# Patient Record
Sex: MALE | Employment: OTHER
[De-identification: names, ages, dates, MRNs, and addresses within clinical notes are randomized per-mention and may not be internally consistent; named-entity substitution may affect disease eponyms.]

---

## 2021-09-07 ENCOUNTER — NEW PATIENT (OUTPATIENT)
Age: 81
End: 2021-09-07

## 2021-09-07 DIAGNOSIS — H50.10: ICD-10-CM

## 2021-09-07 DIAGNOSIS — H02.88B: ICD-10-CM

## 2021-09-07 DIAGNOSIS — H18.593: ICD-10-CM

## 2021-09-07 DIAGNOSIS — H02.88A: ICD-10-CM

## 2021-09-07 DIAGNOSIS — Z98.890: ICD-10-CM

## 2021-09-07 DIAGNOSIS — H35.3132: ICD-10-CM

## 2021-09-07 DIAGNOSIS — H40.013: ICD-10-CM

## 2021-09-07 DIAGNOSIS — H25.13: ICD-10-CM

## 2021-09-07 PROCEDURE — 92025 CPTRIZED CORNEAL TOPOGRAPHY: CPT

## 2021-09-07 PROCEDURE — 76514 ECHO EXAM OF EYE THICKNESS: CPT

## 2021-09-07 PROCEDURE — 92134 CPTRZ OPH DX IMG PST SGM RTA: CPT

## 2021-09-07 PROCEDURE — 92004 COMPRE OPH EXAM NEW PT 1/>: CPT

## 2021-09-07 ASSESSMENT — KERATOMETRY
OD_AXISANGLE2_DEGREES: 177
OS_K1POWER_DIOPTERS: 42.75
OD_K1POWER_DIOPTERS: 41.00
OD_K2POWER_DIOPTERS: 43.00
OS_AXISANGLE2_DEGREES: 180
OS_AXISANGLE_DEGREES: 90
OD_AXISANGLE_DEGREES: 87
OS_K2POWER_DIOPTERS: 45.25

## 2021-09-07 ASSESSMENT — PACHYMETRY
OS_CT_UM: 579
OD_CT_UM: 585

## 2021-09-07 ASSESSMENT — VISUAL ACUITY
OD_CC: 20/40-2
OS_CC: 20/50+1
OS_PH: 20/30-2

## 2021-09-07 ASSESSMENT — TONOMETRY
OD_IOP_MMHG: 16
OS_IOP_MMHG: 15

## 2021-09-15 ENCOUNTER — DIAGNOSTICS ONLY (OUTPATIENT)
Age: 81
End: 2021-09-15

## 2021-09-15 DIAGNOSIS — H47.012: ICD-10-CM

## 2021-09-15 DIAGNOSIS — H40.013: ICD-10-CM

## 2021-09-15 PROCEDURE — 92133 CPTRZD OPH DX IMG PST SGM ON: CPT

## 2021-09-15 PROCEDURE — 92083 EXTENDED VISUAL FIELD XM: CPT

## 2021-09-15 ASSESSMENT — KERATOMETRY
OS_AXISANGLE2_DEGREES: 180
OD_AXISANGLE2_DEGREES: 177
OS_K2POWER_DIOPTERS: 45.25
OD_K2POWER_DIOPTERS: 43.00
OD_AXISANGLE_DEGREES: 87
OS_K1POWER_DIOPTERS: 42.75
OS_AXISANGLE_DEGREES: 90
OD_K1POWER_DIOPTERS: 41.00

## 2022-03-17 ENCOUNTER — IOP CHECK (OUTPATIENT)
Age: 82
End: 2022-03-17

## 2022-03-17 DIAGNOSIS — H47.012: ICD-10-CM

## 2022-03-17 DIAGNOSIS — H02.88A: ICD-10-CM

## 2022-03-17 DIAGNOSIS — H02.88B: ICD-10-CM

## 2022-03-17 DIAGNOSIS — H35.3132: ICD-10-CM

## 2022-03-17 DIAGNOSIS — H18.593: ICD-10-CM

## 2022-03-17 DIAGNOSIS — H40.013: ICD-10-CM

## 2022-03-17 PROCEDURE — 92012 INTRM OPH EXAM EST PATIENT: CPT

## 2022-03-17 PROCEDURE — 92134 CPTRZ OPH DX IMG PST SGM RTA: CPT

## 2022-03-17 ASSESSMENT — TONOMETRY
OS_IOP_MMHG: 16
OD_IOP_MMHG: 16

## 2022-03-17 ASSESSMENT — VISUAL ACUITY
OS_CC: 20/40-2
OD_CC: 20/40-2

## 2022-09-20 ENCOUNTER — COMPLETE EYE EXAM (OUTPATIENT)
Dept: URBAN - METROPOLITAN AREA CLINIC 71 | Facility: CLINIC | Age: 82
End: 2022-09-20

## 2022-09-20 DIAGNOSIS — H35.3132: ICD-10-CM

## 2022-09-20 DIAGNOSIS — H50.10: ICD-10-CM

## 2022-09-20 DIAGNOSIS — H18.593: ICD-10-CM

## 2022-09-20 DIAGNOSIS — H40.013: ICD-10-CM

## 2022-09-20 DIAGNOSIS — Z98.890: ICD-10-CM

## 2022-09-20 DIAGNOSIS — H02.88B: ICD-10-CM

## 2022-09-20 DIAGNOSIS — H25.13: ICD-10-CM

## 2022-09-20 DIAGNOSIS — H02.88A: ICD-10-CM

## 2022-09-20 DIAGNOSIS — H47.012: ICD-10-CM

## 2022-09-20 PROCEDURE — 92025 CPTRIZED CORNEAL TOPOGRAPHY: CPT

## 2022-09-20 PROCEDURE — 92083 EXTENDED VISUAL FIELD XM: CPT

## 2022-09-20 PROCEDURE — 76514 ECHO EXAM OF EYE THICKNESS: CPT | Mod: NC

## 2022-09-20 PROCEDURE — 92133 CPTRZD OPH DX IMG PST SGM ON: CPT

## 2022-09-20 PROCEDURE — 92014 COMPRE OPH EXAM EST PT 1/>: CPT

## 2022-09-20 ASSESSMENT — VISUAL ACUITY
OS_CC: 20/40
OD_CC: 20/50
OD_PH: 20/40

## 2022-09-20 ASSESSMENT — KERATOMETRY
OS_AXISANGLE_DEGREES: 96
OS_AXISANGLE2_DEGREES: 6
OD_K2POWER_DIOPTERS: 43.25
OS_K1POWER_DIOPTERS: 41.25
OD_K1POWER_DIOPTERS: 41.00
OD_AXISANGLE_DEGREES: 78
OD_AXISANGLE2_DEGREES: 168
OS_K2POWER_DIOPTERS: 43.50

## 2022-09-20 ASSESSMENT — TONOMETRY
OS_IOP_MMHG: 15
OD_IOP_MMHG: 17

## 2022-09-20 ASSESSMENT — PACHYMETRY
OD_CT_UM: 585
OS_CT_UM: 579

## 2023-02-23 ENCOUNTER — PROBLEM (OUTPATIENT)
Dept: URBAN - METROPOLITAN AREA CLINIC 71 | Facility: CLINIC | Age: 83
End: 2023-02-23

## 2023-02-23 DIAGNOSIS — H50.10: ICD-10-CM

## 2023-02-23 DIAGNOSIS — H40.013: ICD-10-CM

## 2023-02-23 DIAGNOSIS — H18.593: ICD-10-CM

## 2023-02-23 DIAGNOSIS — Z98.890: ICD-10-CM

## 2023-02-23 DIAGNOSIS — H02.88B: ICD-10-CM

## 2023-02-23 DIAGNOSIS — H35.3221: ICD-10-CM

## 2023-02-23 DIAGNOSIS — H02.88A: ICD-10-CM

## 2023-02-23 DIAGNOSIS — H25.13: ICD-10-CM

## 2023-02-23 DIAGNOSIS — H47.012: ICD-10-CM

## 2023-02-23 DIAGNOSIS — H35.3112: ICD-10-CM

## 2023-02-23 PROCEDURE — 92134 CPTRZ OPH DX IMG PST SGM RTA: CPT

## 2023-02-23 PROCEDURE — 99215 OFFICE O/P EST HI 40 MIN: CPT

## 2023-02-23 ASSESSMENT — TONOMETRY
OS_IOP_MMHG: 15
OD_IOP_MMHG: 15

## 2023-02-23 ASSESSMENT — VISUAL ACUITY
OS_CC: 20/50-2
OD_CC: 20/50

## 2023-08-09 ENCOUNTER — APPOINTMENT (RX ONLY)
Dept: URBAN - METROPOLITAN AREA CLINIC 184 | Facility: CLINIC | Age: 83
Setting detail: DERMATOLOGY
End: 2023-08-09

## 2023-08-09 DIAGNOSIS — L57.8 OTHER SKIN CHANGES DUE TO CHRONIC EXPOSURE TO NONIONIZING RADIATION: ICD-10-CM

## 2023-08-09 DIAGNOSIS — L81.4 OTHER MELANIN HYPERPIGMENTATION: ICD-10-CM

## 2023-08-09 DIAGNOSIS — D18.0 HEMANGIOMA: ICD-10-CM

## 2023-08-09 DIAGNOSIS — L71.1 RHINOPHYMA: ICD-10-CM

## 2023-08-09 DIAGNOSIS — L82.1 OTHER SEBORRHEIC KERATOSIS: ICD-10-CM

## 2023-08-09 DIAGNOSIS — L71.8 OTHER ROSACEA: ICD-10-CM

## 2023-08-09 PROBLEM — D18.01 HEMANGIOMA OF SKIN AND SUBCUTANEOUS TISSUE: Status: ACTIVE | Noted: 2023-08-09

## 2023-08-09 PROCEDURE — ? PRESCRIPTION

## 2023-08-09 PROCEDURE — ? COUNSELING

## 2023-08-09 PROCEDURE — ? PRESCRIPTION MEDICATION MANAGEMENT

## 2023-08-09 PROCEDURE — 99204 OFFICE O/P NEW MOD 45 MIN: CPT

## 2023-08-09 RX ORDER — METRONIDAZOLE 7.5 MG/G
0.75% GEL TOPICAL
Qty: 45 | Refills: 6 | Status: CANCELLED | COMMUNITY
Start: 2023-08-09

## 2023-08-09 RX ADMIN — METRONIDAZOLE 0.75%: 7.5 GEL TOPICAL at 00:00

## 2023-08-09 ASSESSMENT — LOCATION ZONE DERM
LOCATION ZONE: ARM
LOCATION ZONE: NECK
LOCATION ZONE: TRUNK
LOCATION ZONE: ARM
LOCATION ZONE: NOSE
LOCATION ZONE: NOSE
LOCATION ZONE: NECK
LOCATION ZONE: TRUNK
LOCATION ZONE: FACE
LOCATION ZONE: FACE

## 2023-08-09 ASSESSMENT — LOCATION SIMPLE DESCRIPTION DERM
LOCATION SIMPLE: RIGHT UPPER BACK
LOCATION SIMPLE: POSTERIOR NECK
LOCATION SIMPLE: NOSE
LOCATION SIMPLE: RIGHT UPPER BACK
LOCATION SIMPLE: LEFT FOREARM
LOCATION SIMPLE: RIGHT CHEEK
LOCATION SIMPLE: NOSE
LOCATION SIMPLE: LEFT CHEEK
LOCATION SIMPLE: ABDOMEN
LOCATION SIMPLE: LEFT CHEEK
LOCATION SIMPLE: RIGHT CHEEK
LOCATION SIMPLE: ABDOMEN
LOCATION SIMPLE: POSTERIOR NECK
LOCATION SIMPLE: LEFT FOREARM

## 2023-08-09 ASSESSMENT — LOCATION DETAILED DESCRIPTION DERM
LOCATION DETAILED: LEFT CENTRAL MALAR CHEEK
LOCATION DETAILED: RIGHT SUPERIOR UPPER BACK
LOCATION DETAILED: NASAL TIP
LOCATION DETAILED: RIGHT RIB CAGE
LOCATION DETAILED: RIGHT CENTRAL MALAR CHEEK
LOCATION DETAILED: RIGHT SUPERIOR UPPER BACK
LOCATION DETAILED: RIGHT CENTRAL MALAR CHEEK
LOCATION DETAILED: NASAL TIP
LOCATION DETAILED: LEFT PROXIMAL DORSAL FOREARM
LOCATION DETAILED: NASAL DORSUM
LOCATION DETAILED: LEFT CENTRAL MALAR CHEEK
LOCATION DETAILED: RIGHT MEDIAL TRAPEZIAL NECK
LOCATION DETAILED: LEFT PROXIMAL DORSAL FOREARM
LOCATION DETAILED: RIGHT MEDIAL TRAPEZIAL NECK
LOCATION DETAILED: NASAL DORSUM
LOCATION DETAILED: RIGHT RIB CAGE

## 2023-08-09 NOTE — PROCEDURE: PRESCRIPTION MEDICATION MANAGEMENT
Discontinue Regimen: Clindamycin BP gel
Render In Strict Bullet Format?: No
Continue Regimen: Doxycycline 100 qd for 2 weeks take a break, only continue for flare ups as needed.
Detail Level: Zone
Initiate Treatment: Metronidazole gel bid

## 2023-09-01 ENCOUNTER — ESTABLISHED COMPREHENSIVE EXAM (OUTPATIENT)
Dept: URBAN - METROPOLITAN AREA CLINIC 71 | Facility: CLINIC | Age: 83
End: 2023-09-01

## 2023-09-01 ENCOUNTER — RX ONLY (OUTPATIENT)
Age: 83
Setting detail: RX ONLY
End: 2023-09-01

## 2023-09-01 DIAGNOSIS — H18.593: ICD-10-CM

## 2023-09-01 DIAGNOSIS — H50.10: ICD-10-CM

## 2023-09-01 DIAGNOSIS — H40.013: ICD-10-CM

## 2023-09-01 DIAGNOSIS — H02.88A: ICD-10-CM

## 2023-09-01 DIAGNOSIS — H02.88B: ICD-10-CM

## 2023-09-01 DIAGNOSIS — H25.13: ICD-10-CM

## 2023-09-01 DIAGNOSIS — H35.3112: ICD-10-CM

## 2023-09-01 DIAGNOSIS — H35.3221: ICD-10-CM

## 2023-09-01 DIAGNOSIS — Z98.890: ICD-10-CM

## 2023-09-01 DIAGNOSIS — H47.012: ICD-10-CM

## 2023-09-01 PROCEDURE — 92083 EXTENDED VISUAL FIELD XM: CPT

## 2023-09-01 PROCEDURE — 92014 COMPRE OPH EXAM EST PT 1/>: CPT

## 2023-09-01 PROCEDURE — 92025 CPTRIZED CORNEAL TOPOGRAPHY: CPT

## 2023-09-01 PROCEDURE — 92133 CPTRZD OPH DX IMG PST SGM ON: CPT

## 2023-09-01 RX ORDER — METRONIDAZOLE 7.5 MG/G
0.75% GEL TOPICAL
Qty: 135 | Refills: 3 | Status: ERX

## 2023-09-01 ASSESSMENT — KERATOMETRY
OS_K1POWER_DIOPTERS: 40.50
OD_K2POWER_DIOPTERS: 43.50
OS_AXISANGLE2_DEGREES: 176
OD_K1POWER_DIOPTERS: 41.25
OS_K2POWER_DIOPTERS: 44.00
OS_AXISANGLE_DEGREES: 86
OD_AXISANGLE_DEGREES: 81
OD_AXISANGLE2_DEGREES: 171

## 2023-09-01 ASSESSMENT — VISUAL ACUITY
OD_CC: 20/30-2
OS_CC: 20/40

## 2023-09-01 ASSESSMENT — TONOMETRY
OS_IOP_MMHG: 14
OD_IOP_MMHG: 17

## 2023-09-21 ENCOUNTER — RX ONLY (OUTPATIENT)
Age: 83
Setting detail: RX ONLY
End: 2023-09-21

## 2023-09-21 RX ORDER — BRIMONIDINE TARTRATE 5 MG/G
0.33% GEL TOPICAL
Qty: 30 | Refills: 1 | Status: ERX | COMMUNITY
Start: 2023-09-21

## 2023-10-12 ENCOUNTER — RX ONLY (OUTPATIENT)
Age: 83
Setting detail: RX ONLY
End: 2023-10-12

## 2023-10-12 RX ORDER — BRIMONIDINE TARTRATE 5 MG/G
0.33% GEL TOPICAL
Qty: 30 | Refills: 2 | Status: ERX

## 2023-11-21 ENCOUNTER — APPOINTMENT (RX ONLY)
Dept: URBAN - METROPOLITAN AREA CLINIC 184 | Facility: CLINIC | Age: 83
Setting detail: DERMATOLOGY
End: 2023-11-21

## 2023-11-21 DIAGNOSIS — L71.8 OTHER ROSACEA: ICD-10-CM | Status: UNCHANGED

## 2023-11-21 PROCEDURE — ? COUNSELING

## 2023-11-21 PROCEDURE — ? PRESCRIPTION

## 2023-11-21 PROCEDURE — 99214 OFFICE O/P EST MOD 30 MIN: CPT

## 2023-11-21 PROCEDURE — ? PRESCRIPTION MEDICATION MANAGEMENT

## 2023-11-21 PROCEDURE — ? DIAGNOSIS COMMENT

## 2023-11-21 RX ORDER — MINOCYCLINE HYDROCHLORIDE 100 MG/1
CAPSULE ORAL BID
Qty: 180 | Refills: 0 | Status: ERX | COMMUNITY
Start: 2023-11-21

## 2023-11-21 RX ORDER — TACROLIMUS 1 MG/G
OINTMENT TOPICAL
Qty: 60 | Refills: 2 | Status: ERX | COMMUNITY
Start: 2023-11-21

## 2023-11-21 RX ADMIN — MINOCYCLINE HYDROCHLORIDE: 100 CAPSULE ORAL at 00:00

## 2023-11-21 RX ADMIN — TACROLIMUS: 1 OINTMENT TOPICAL at 00:00

## 2023-11-21 ASSESSMENT — LOCATION DETAILED DESCRIPTION DERM
LOCATION DETAILED: RIGHT CENTRAL MALAR CHEEK
LOCATION DETAILED: NASAL DORSUM
LOCATION DETAILED: LEFT CENTRAL MALAR CHEEK
LOCATION DETAILED: RIGHT CENTRAL MALAR CHEEK
LOCATION DETAILED: NASAL DORSUM
LOCATION DETAILED: LEFT CENTRAL MALAR CHEEK

## 2023-11-21 ASSESSMENT — LOCATION SIMPLE DESCRIPTION DERM
LOCATION SIMPLE: RIGHT CHEEK
LOCATION SIMPLE: NOSE
LOCATION SIMPLE: LEFT CHEEK
LOCATION SIMPLE: NOSE
LOCATION SIMPLE: LEFT CHEEK
LOCATION SIMPLE: RIGHT CHEEK

## 2023-11-21 ASSESSMENT — LOCATION ZONE DERM
LOCATION ZONE: FACE
LOCATION ZONE: FACE
LOCATION ZONE: NOSE
LOCATION ZONE: NOSE

## 2023-11-21 NOTE — PROCEDURE: PRESCRIPTION MEDICATION MANAGEMENT
Discontinue Regimen: Marvasio topical \\nMetronidazole gel bid\\nDoxycycline 100 qd for 2 weeks take a break, only continue for flare ups as needed.
Render In Strict Bullet Format?: No
Detail Level: Zone
Initiate Treatment: -minocycline 100 mg capsule BID Sig: Take one pill twice daily with a full glass of water.  Do not lie down 1 hour after taking. ( decrease to 1pill QD if side effects occur)\\n-tacrolimus 0.1 % topical ointment Sig: Apply a thin layer to affected skin on face BID

## 2023-11-21 NOTE — PROCEDURE: DIAGNOSIS COMMENT
Detail Level: Simple
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 Mutually agreed to start minocycline 100mg po BID, significant SEs including severe reaction discussed. Tacrolimus 0.1% topical added.
Render Risk Assessment In Note?: no

## 2024-03-01 ENCOUNTER — IOP CHECK (OUTPATIENT)
Dept: URBAN - METROPOLITAN AREA CLINIC 71 | Facility: CLINIC | Age: 84
End: 2024-03-01

## 2024-03-01 DIAGNOSIS — H02.88A: ICD-10-CM

## 2024-03-01 DIAGNOSIS — H18.593: ICD-10-CM

## 2024-03-01 DIAGNOSIS — H02.88B: ICD-10-CM

## 2024-03-01 DIAGNOSIS — H40.013: ICD-10-CM

## 2024-03-01 PROCEDURE — 99213 OFFICE O/P EST LOW 20 MIN: CPT

## 2024-03-01 ASSESSMENT — VISUAL ACUITY
OS_PH: 20/40
OD_PH: 20/40
OS_CC: 20/50
OD_CC: 20/50

## 2024-03-01 ASSESSMENT — TONOMETRY
OS_IOP_MMHG: 18
OD_IOP_MMHG: 18

## 2025-03-06 ENCOUNTER — IOP CHECK (OUTPATIENT)
Dept: URBAN - METROPOLITAN AREA CLINIC 71 | Facility: CLINIC | Age: 85
End: 2025-03-06

## 2025-03-06 DIAGNOSIS — H02.88A: ICD-10-CM

## 2025-03-06 DIAGNOSIS — H25.13: ICD-10-CM

## 2025-03-06 DIAGNOSIS — H18.593: ICD-10-CM

## 2025-03-06 DIAGNOSIS — H02.88B: ICD-10-CM

## 2025-03-06 DIAGNOSIS — H35.3112: ICD-10-CM

## 2025-03-06 DIAGNOSIS — H35.3221: ICD-10-CM

## 2025-03-06 DIAGNOSIS — H47.012: ICD-10-CM

## 2025-03-06 DIAGNOSIS — H40.013: ICD-10-CM

## 2025-03-06 PROCEDURE — 92134 CPTRZ OPH DX IMG PST SGM RTA: CPT | Mod: NC

## 2025-03-06 PROCEDURE — 99214 OFFICE O/P EST MOD 30 MIN: CPT

## 2025-03-06 PROCEDURE — 92133 CPTRZD OPH DX IMG PST SGM ON: CPT

## 2025-03-06 ASSESSMENT — KERATOMETRY
OS_K1POWER_DIOPTERS: 40.75
OD_K2POWER_DIOPTERS: 44.00
OD_K1POWER_DIOPTERS: 41.25
OS_AXISANGLE_DEGREES: 82
OD_AXISANGLE2_DEGREES: 170
OD_AXISANGLE_DEGREES: 80
OS_AXISANGLE2_DEGREES: 172
OS_K2POWER_DIOPTERS: 44.00

## 2025-03-06 ASSESSMENT — VISUAL ACUITY
OD_CC: 20/50
OS_CC: 20/40-2

## 2025-03-06 ASSESSMENT — TONOMETRY
OD_IOP_MMHG: 19
OS_IOP_MMHG: 17

## 2025-05-21 ENCOUNTER — DIAGNOSTICS ONLY (OUTPATIENT)
Dept: URBAN - METROPOLITAN AREA CLINIC 71 | Facility: CLINIC | Age: 85
End: 2025-05-21

## 2025-05-21 DIAGNOSIS — H18.593: ICD-10-CM

## 2025-05-21 DIAGNOSIS — H25.13: ICD-10-CM

## 2025-05-21 PROCEDURE — 92136 OPHTHALMIC BIOMETRY: CPT

## 2025-05-21 PROCEDURE — 92025 CPTRIZED CORNEAL TOPOGRAPHY: CPT

## 2025-05-21 ASSESSMENT — KERATOMETRY
OS_AXISANGLE2_DEGREES: 172
OD_K2POWER_DIOPTERS: 43.75
OD_AXISANGLE_DEGREES: 80
OS_AXISANGLE_DEGREES: 82
OD_AXISANGLE_DEGREES: 85
OD_K1POWER_DIOPTERS: 40.75
OD_AXISANGLE2_DEGREES: 170
OS_K2POWER_DIOPTERS: 44.00
OS_K1POWER_DIOPTERS: 40.75
OS_AXISANGLE_DEGREES: 88
OS_K1POWER_DIOPTERS: 40.50
OD_AXISANGLE2_DEGREES: 175
OD_K1POWER_DIOPTERS: 41.25
OS_AXISANGLE2_DEGREES: 178
OD_K2POWER_DIOPTERS: 44.00

## 2025-06-03 ASSESSMENT — KERATOMETRY
OS_K2POWER_DIOPTERS: 44.00
OD_AXISANGLE2_DEGREES: 170
OD_K1POWER_DIOPTERS: 40.75
OD_AXISANGLE_DEGREES: 80
OS_AXISANGLE2_DEGREES: 172
OD_K2POWER_DIOPTERS: 44.00
OD_K1POWER_DIOPTERS: 41.25
OS_K1POWER_DIOPTERS: 40.50
OS_AXISANGLE_DEGREES: 82
OD_K2POWER_DIOPTERS: 43.75
OS_AXISANGLE2_DEGREES: 178
OS_AXISANGLE_DEGREES: 88
OD_AXISANGLE2_DEGREES: 175
OS_K1POWER_DIOPTERS: 40.75
OD_AXISANGLE_DEGREES: 85

## 2025-06-04 ENCOUNTER — SURGERY/PROCEDURE (OUTPATIENT)
Dept: URBAN - METROPOLITAN AREA SURGICAL CENTER 28 | Facility: SURGICAL CENTER | Age: 85
End: 2025-06-04

## 2025-06-04 DIAGNOSIS — H25.12: ICD-10-CM

## 2025-06-04 DIAGNOSIS — H57.03: ICD-10-CM

## 2025-06-04 PROCEDURE — 66982 XCAPSL CTRC RMVL CPLX WO ECP: CPT

## 2025-06-05 ENCOUNTER — POST-OP CHECK (OUTPATIENT)
Dept: URBAN - METROPOLITAN AREA CLINIC 71 | Facility: CLINIC | Age: 85
End: 2025-06-05

## 2025-06-05 DIAGNOSIS — Z96.1: ICD-10-CM

## 2025-06-05 PROCEDURE — 99024 POSTOP FOLLOW-UP VISIT: CPT

## 2025-06-05 ASSESSMENT — VISUAL ACUITY
OS_SC: 20/300
OS_PH: 20/50-2

## 2025-06-12 ENCOUNTER — POST-OP CHECK (OUTPATIENT)
Dept: URBAN - METROPOLITAN AREA CLINIC 71 | Facility: CLINIC | Age: 85
End: 2025-06-12

## 2025-06-12 DIAGNOSIS — H25.11: ICD-10-CM

## 2025-06-12 DIAGNOSIS — Z96.1: ICD-10-CM

## 2025-06-12 PROCEDURE — 99024 POSTOP FOLLOW-UP VISIT: CPT

## 2025-06-12 PROCEDURE — 92136 OPHTHALMIC BIOMETRY: CPT | Mod: 26,RT

## 2025-06-12 ASSESSMENT — VISUAL ACUITY
OS_PH: 20/40+2
OS_SC: 20/50
OD_CC: 20/50

## 2025-06-12 ASSESSMENT — KERATOMETRY
OS_AXISANGLE2_DEGREES: 172
OS_K2POWER_DIOPTERS: 43.25
OS_K1POWER_DIOPTERS: 39.00
OS_AXISANGLE_DEGREES: 82

## 2025-06-12 ASSESSMENT — TONOMETRY: OS_IOP_MMHG: 12
